# Patient Record
Sex: FEMALE | Race: WHITE | NOT HISPANIC OR LATINO | Employment: OTHER | URBAN - METROPOLITAN AREA
[De-identification: names, ages, dates, MRNs, and addresses within clinical notes are randomized per-mention and may not be internally consistent; named-entity substitution may affect disease eponyms.]

---

## 2021-07-27 PROBLEM — I10 HYPERTENSION: Status: ACTIVE | Noted: 2021-07-27

## 2021-07-27 PROBLEM — E78.5 HYPERLIPIDEMIA: Status: ACTIVE | Noted: 2021-07-27

## 2021-07-27 PROBLEM — M19.90 OSTEOARTHRITIS: Status: ACTIVE | Noted: 2021-07-27

## 2021-07-27 PROBLEM — F32.A DEPRESSION: Status: ACTIVE | Noted: 2021-07-27

## 2021-07-27 PROBLEM — J44.9 CHRONIC OBSTRUCTIVE PULMONARY DISEASE (HCC): Status: ACTIVE | Noted: 2021-07-27

## 2021-07-27 PROBLEM — E03.9 HYPOTHYROIDISM: Status: ACTIVE | Noted: 2021-07-27

## 2021-07-27 RX ORDER — DOXAZOSIN MESYLATE 4 MG/1
TABLET ORAL
COMMUNITY
Start: 2015-09-22

## 2021-07-27 RX ORDER — LEVOTHYROXINE SODIUM 0.05 MG/1
TABLET ORAL
COMMUNITY
Start: 2015-09-22

## 2021-07-27 RX ORDER — MELOXICAM 15 MG/1
TABLET ORAL
COMMUNITY
Start: 2015-09-22

## 2021-07-27 RX ORDER — ATENOLOL 50 MG/1
TABLET ORAL
COMMUNITY
Start: 2015-09-22

## 2021-07-27 RX ORDER — FLUOXETINE HYDROCHLORIDE 40 MG/1
CAPSULE ORAL
COMMUNITY
Start: 2015-09-22 | End: 2022-10-31

## 2021-07-27 RX ORDER — LOVASTATIN 40 MG/1
TABLET ORAL
COMMUNITY
Start: 2015-09-22

## 2021-07-27 RX ORDER — ALPRAZOLAM 1 MG/1
TABLET ORAL
COMMUNITY
Start: 2015-09-22

## 2021-07-27 RX ORDER — TRAZODONE HYDROCHLORIDE 150 MG/1
TABLET ORAL
COMMUNITY
Start: 2015-09-22

## 2021-07-27 RX ORDER — HYDROCODONE BITARTRATE AND ACETAMINOPHEN 10; 325 MG/1; MG/1
TABLET ORAL
COMMUNITY
Start: 2015-09-22 | End: 2022-10-31

## 2021-07-27 RX ORDER — GABAPENTIN 300 MG/1
300 CAPSULE ORAL 3 TIMES DAILY
COMMUNITY
Start: 2015-09-22

## 2021-07-27 RX ORDER — ASPIRIN 81 MG/1
TABLET ORAL
COMMUNITY
Start: 2015-09-22 | End: 2022-10-31

## 2021-07-27 RX ORDER — ESOMEPRAZOLE MAGNESIUM 40 MG/1
FOR SUSPENSION ORAL
COMMUNITY
Start: 2015-09-22 | End: 2022-10-31

## 2021-08-02 ENCOUNTER — OFFICE VISIT (OUTPATIENT)
Dept: NEUROLOGY | Facility: CLINIC | Age: 71
End: 2021-08-02

## 2021-08-02 VITALS
HEART RATE: 47 BPM | HEIGHT: 65 IN | WEIGHT: 207 LBS | BODY MASS INDEX: 34.49 KG/M2 | DIASTOLIC BLOOD PRESSURE: 85 MMHG | TEMPERATURE: 97.3 F | SYSTOLIC BLOOD PRESSURE: 143 MMHG

## 2021-08-02 DIAGNOSIS — I65.23 ATHEROSCLEROSIS OF BOTH CAROTID ARTERIES: ICD-10-CM

## 2021-08-02 DIAGNOSIS — G60.9 IDIOPATHIC PERIPHERAL NEUROPATHY: Primary | ICD-10-CM

## 2021-08-02 PROCEDURE — 99203 OFFICE O/P NEW LOW 30 MIN: CPT | Performed by: NURSE PRACTITIONER

## 2021-08-02 RX ORDER — FLUCONAZOLE 200 MG/1
TABLET ORAL
COMMUNITY
Start: 2021-05-10 | End: 2022-10-31

## 2021-08-02 RX ORDER — OMEPRAZOLE 40 MG/1
CAPSULE, DELAYED RELEASE ORAL
COMMUNITY
Start: 2021-07-19

## 2021-08-02 RX ORDER — ESCITALOPRAM OXALATE 20 MG/1
TABLET ORAL
COMMUNITY
Start: 2021-06-15

## 2021-08-02 RX ORDER — METHOCARBAMOL 750 MG/1
50000 TABLET ORAL WEEKLY
COMMUNITY
Start: 2021-07-14

## 2021-08-02 RX ORDER — FUROSEMIDE 40 MG/1
TABLET ORAL
COMMUNITY
Start: 2021-06-25

## 2022-10-31 ENCOUNTER — OFFICE VISIT (OUTPATIENT)
Dept: CARDIOLOGY | Facility: CLINIC | Age: 72
End: 2022-10-31

## 2022-10-31 VITALS
WEIGHT: 218.2 LBS | HEIGHT: 66 IN | BODY MASS INDEX: 35.07 KG/M2 | DIASTOLIC BLOOD PRESSURE: 80 MMHG | SYSTOLIC BLOOD PRESSURE: 140 MMHG | OXYGEN SATURATION: 96 % | HEART RATE: 57 BPM

## 2022-10-31 DIAGNOSIS — E78.2 MIXED HYPERLIPIDEMIA: Primary | ICD-10-CM

## 2022-10-31 DIAGNOSIS — I10 BENIGN ESSENTIAL HTN: ICD-10-CM

## 2022-10-31 DIAGNOSIS — R06.02 SOB (SHORTNESS OF BREATH): ICD-10-CM

## 2022-10-31 PROCEDURE — 99204 OFFICE O/P NEW MOD 45 MIN: CPT | Performed by: INTERNAL MEDICINE

## 2022-10-31 RX ORDER — LACTULOSE 10 G/15ML
10 SOLUTION ORAL 3 TIMES DAILY
COMMUNITY

## 2022-10-31 NOTE — PROGRESS NOTES
Date of Office Visit: 10/31/2022  Encounter Provider: Dr. Cruz Conde  Place of Service: [facility] CARDIOLOGY [place]  Patient Name: Dulce Roberts  :1950  Laci Willett MD    Chief Complaint   Patient presents with   • Shortness of Breath   • Edema     History of Present Illness:    I am pleased to see Mrs. Roberts in my office today as a new consultation.    As you know, patient is 72 years old white female whose past medical history significant for obstructive sleep apnea, hypertension, hyperlipidemia, who is referred to me for symptom of chest discomfort and shortness of breath.    Patient reports that she is having symptom of shortness of breath.  She walked for short distance and became extremely short of breath.  She has trace leg edema.  Patient is on diuretics.  Patient recently is complaining of chest pressure and heaviness described as elephant sitting on her chest especially with exertion.  Patient denies any orthopnea or PND.    Patient does not have previous history of CAD, PCI or MI.  Patient does not smoke or abuse alcohol.    In 2022, patient underwent echocardiogram which showed normal left ventricular size and function with a EF of 55 to 60%.  Mild mitral and tricuspid regurgitation noted.  RV systolic pressure was 39 mmHg.    At this stage I would recommend that patient should proceed with stress test with [facility].        Past Medical History:   Diagnosis Date   • Acute bronchitis    • Acute sinusitis    • Benign essential hypertension    • Bronchitis    • Coronary atherosclerosis    • Dysphagia    • Essential hypertension    • Essential tremor    • Family history of polyps in the colon    • GERD (gastroesophageal reflux disease)    • Hyperlipidemia    • Hypothyroidism    • Major depressive disorder    • Otitis    • Parkinson's disease (HCC)    • Reactive depression    • Tremor    • Upper respiratory infection          Past Surgical History:   Procedure  Laterality Date   • HYSTERECTOMY     • KNEE SURGERY             Current Outpatient Medications:   •  ALPRAZolam (XANAX) 1 MG tablet, ALPRAZOLAM 1 MG TABS, Disp: , Rfl:   •  atenolol (TENORMIN) 50 MG tablet, ATENOLOL 50 MG TABS, Disp: , Rfl:   •  carbidopa-levodopa (SINEMET)  MG per tablet, Take 1 tablet by mouth 3 (Three) Times a Day., Disp: , Rfl:   •  D3-50 1.25 MG (71700 UT) capsule, Take 50,000 Units by mouth 1 (One) Time Per Week., Disp: , Rfl:   •  doxazosin (CARDURA) 4 MG tablet, DOXAZOSIN MESYLATE 4 MG TABS, Disp: , Rfl:   •  escitalopram (LEXAPRO) 20 MG tablet, , Disp: , Rfl:   •  furosemide (LASIX) 40 MG tablet, , Disp: , Rfl:   •  gabapentin (NEURONTIN) 300 MG capsule, 300 mg 3 (Three) Times a Day., Disp: , Rfl:   •  lactulose (CHRONULAC) 10 GM/15ML solution, Take 15 mL by mouth 3 (Three) Times a Day., Disp: , Rfl:   •  levothyroxine (SYNTHROID, LEVOTHROID) 50 MCG tablet, LEVOTHYROXINE SODIUM 50 MCG TABS, Disp: , Rfl:   •  lovastatin (MEVACOR) 40 MG tablet, LOVASTATIN 40 MG TABS, Disp: , Rfl:   •  meloxicam (MOBIC) 15 MG tablet, MELOXICAM 15 MG TABS, Disp: , Rfl:   •  omeprazole (priLOSEC) 40 MG capsule, , Disp: , Rfl:   •  traZODone (DESYREL) 150 MG tablet, TRAZODONE  MG TABS, Disp: , Rfl:   •  isosorbide mononitrate (IMDUR) 30 MG 24 hr tablet, Take 1 tablet by mouth Daily., Disp: 90 tablet, Rfl: 3      Social History     Socioeconomic History   • Marital status: Unknown   Tobacco Use   • Smoking status: Never   • Smokeless tobacco: Current   Vaping Use   • Vaping Use: Never used   Substance and Sexual Activity   • Alcohol use: Never   • Drug use: Defer   • Sexual activity: Defer         Review of Systems   Constitutional: Negative for chills and fever.   HENT: Negative for ear discharge and nosebleeds.    Eyes: Negative for discharge and redness.   Cardiovascular: Positive for chest pain and leg swelling. Negative for orthopnea, palpitations, paroxysmal nocturnal dyspnea and syncope.  "  Respiratory: Positive for shortness of breath. Negative for cough and wheezing.    Endocrine: Negative for heat intolerance.   Skin: Negative for rash.   Musculoskeletal: Positive for arthritis, back pain and joint pain. Negative for myalgias.   Gastrointestinal: Negative for abdominal pain, melena, nausea and vomiting.   Genitourinary: Negative for dysuria and hematuria.   Neurological: Negative for dizziness, light-headedness, numbness and tremors.   Psychiatric/Behavioral: Negative for depression. The patient is not nervous/anxious.        Procedures    Procedures    No orders to display           Objective:    /80   Pulse 57   Ht 167.6 cm (66\")   Wt 99 kg (218 lb 3.2 oz)   SpO2 96%   BMI 35.22 kg/m²         Constitutional:       Appearance: Well-developed.   Eyes:      General: No scleral icterus.        Right eye: No discharge.   HENT:      Head: Normocephalic and atraumatic.   Neck:      Thyroid: No thyromegaly.      Lymphadenopathy: No cervical adenopathy.   Pulmonary:      Effort: Pulmonary effort is normal. No respiratory distress.      Breath sounds: Normal breath sounds. No wheezing. No rales.   Cardiovascular:      Normal rate. Regular rhythm.      No gallop.   Abdominal:      Tenderness: There is no abdominal tenderness.   Skin:     Findings: No erythema or rash.   Neurological:      Mental Status: Alert and oriented to person, place, and time.             Assessment:       Diagnosis Plan   1. Mixed hyperlipidemia  isosorbide mononitrate (IMDUR) 30 MG 24 hr tablet      2. Benign essential HTN  isosorbide mononitrate (IMDUR) 30 MG 24 hr tablet      3. SOB (shortness of breath)  isosorbide mononitrate (IMDUR) 30 MG 24 hr tablet               Plan:       MDM:    1.  Chest discomfort:    I would recommend to proceed with stress test.    2.  Shortness of breath:    Patient complaining of shortness of breath along with chest discomfort.  Echocardiogram showed preserved left ankle function.  Mild " elevation of RV systolic pressure is noted.  Consider stress test.    3.  Hypertension:    Blood pressure is on higher side but I would recommend observation    4.  Bradycardia sinus:    Patient has sinus bradycardia patient is on atenolol.  If heart rate continues to be low I would consider decreasing the dose of atenolol.

## 2022-11-14 ENCOUNTER — OUTSIDE FACILITY SERVICE (OUTPATIENT)
Dept: CARDIOLOGY | Facility: CLINIC | Age: 72
End: 2022-11-14

## 2022-11-14 PROCEDURE — 78452 HT MUSCLE IMAGE SPECT MULT: CPT | Performed by: INTERNAL MEDICINE

## 2022-11-14 PROCEDURE — 93016 CV STRESS TEST SUPVJ ONLY: CPT | Performed by: INTERNAL MEDICINE

## 2022-11-14 PROCEDURE — 93018 CV STRESS TEST I&R ONLY: CPT | Performed by: INTERNAL MEDICINE

## 2022-11-28 RX ORDER — ISOSORBIDE MONONITRATE 30 MG/1
30 TABLET, EXTENDED RELEASE ORAL DAILY
Qty: 90 TABLET | Refills: 3 | Status: SHIPPED | OUTPATIENT
Start: 2022-11-28 | End: 2022-11-30 | Stop reason: SDUPTHER

## 2022-11-30 ENCOUNTER — TELEPHONE (OUTPATIENT)
Dept: CARDIOLOGY | Facility: CLINIC | Age: 72
End: 2022-11-30

## 2022-11-30 DIAGNOSIS — R06.02 SOB (SHORTNESS OF BREATH): ICD-10-CM

## 2022-11-30 DIAGNOSIS — I10 BENIGN ESSENTIAL HTN: ICD-10-CM

## 2022-11-30 DIAGNOSIS — E78.2 MIXED HYPERLIPIDEMIA: ICD-10-CM

## 2022-11-30 RX ORDER — ISOSORBIDE MONONITRATE 30 MG/1
30 TABLET, EXTENDED RELEASE ORAL DAILY
Qty: 90 TABLET | Refills: 3 | Status: SHIPPED | OUTPATIENT
Start: 2022-11-30

## 2022-11-30 NOTE — TELEPHONE ENCOUNTER
She was in October 31st to see Dr Conde  A new heart medication was suppose to be sent in  She didn't get it  Her  was in for an appointment with  Monday  He talked with  about sending it in  He wrote the prescription on a paper and was suppose to resend  it in  She has called both pharmacies  that she uses and they haven't received anything  Please send to [facility]  Please call patient so she know that this ws done

## 2023-05-05 NOTE — PROGRESS NOTES
Date of Office Visit: 2023  Encounter Provider: Dr. Cruz Conde  Place of Service: [facility] CARDIOLOGY [place]  Patient Name: Dulce Roberts  :1950  Laci Willett MD    Chief Complaint   Patient presents with   • Shortness of Breath   • Follow-up   • Hypertension   • Hyperlipidemia   • Coronary Artery Disease     History of Present Illness    I am pleased to see Mrs. Roberts in my office today as a as a follow-up.    As you know, patient is 72 years old white female whose past medical history significant for obstructive sleep apnea, hypertension, hyperlipidemia, who came today for follow-up.    Patient reports that she is having symptom of shortness of breath.  She walked for short distance and became extremely short of breath.  She has trace leg edema.  Patient is on diuretics.  Patient recently is complaining of chest pressure and heaviness described as elephant sitting on her chest especially with exertion.  Patient denies any orthopnea or PND.    Patient does not have previous history of CAD, PCI or MI.  Patient does not smoke or abuse alcohol.    In 2022, patient underwent echocardiogram which showed normal left ventricular size and function with a EF of 55 to 60%.  Mild mitral and tricuspid regurgitation noted.  RV systolic pressure was 39 mmHg.  In 2022, patient underwent stress test which showed no myocardial ischemia.  Fixed inferior defect was noted.    Patient came today for follow-up.  Patient does not have any further episode of chest pain.  Patient does have shortness of breath on exertion.  She is limited in her activities.  Patient denies any orthopnea, PND, syncope or presyncope.  No leg edema noted.    EKG showed normal sinus rhythm nonspecific ST changes noted.  No change in previous EKG    Patient had cardiac work-up including stress test and echocardiogram which is unremarkable.  Patient free of significant symptoms.  I would continue current  therapy.      Past Medical History:   Diagnosis Date   • Acute bronchitis    • Acute sinusitis    • Benign essential hypertension    • Bronchitis    • Coronary atherosclerosis    • Dysphagia    • Essential hypertension    • Essential tremor    • Family history of polyps in the colon    • GERD (gastroesophageal reflux disease)    • Hyperlipidemia    • Hypothyroidism    • Major depressive disorder    • Otitis    • Parkinson's disease    • Reactive depression    • Tremor    • Upper respiratory infection          Past Surgical History:   Procedure Laterality Date   • HYSTERECTOMY     • KNEE SURGERY             Current Outpatient Medications:   •  ALPRAZolam (XANAX) 1 MG tablet, ALPRAZOLAM 1 MG TABS, Disp: , Rfl:   •  atenolol (TENORMIN) 50 MG tablet, ATENOLOL 50 MG TABS, Disp: , Rfl:   •  carbidopa-levodopa (SINEMET)  MG per tablet, Take 1 tablet by mouth 3 (Three) Times a Day., Disp: , Rfl:   •  D3-50 1.25 MG (00011 UT) capsule, Take 1 capsule by mouth 1 (One) Time Per Week., Disp: , Rfl:   •  doxazosin (CARDURA) 4 MG tablet, DOXAZOSIN MESYLATE 4 MG TABS, Disp: , Rfl:   •  escitalopram (LEXAPRO) 20 MG tablet, , Disp: , Rfl:   •  furosemide (LASIX) 40 MG tablet, , Disp: , Rfl:   •  gabapentin (NEURONTIN) 300 MG capsule, 1 capsule 3 (Three) Times a Day., Disp: , Rfl:   •  isosorbide mononitrate (IMDUR) 30 MG 24 hr tablet, Take 1 tablet by mouth Daily., Disp: 90 tablet, Rfl: 3  •  lactulose (CHRONULAC) 10 GM/15ML solution, Take 15 mL by mouth 3 (Three) Times a Day., Disp: , Rfl:   •  levothyroxine (SYNTHROID, LEVOTHROID) 50 MCG tablet, LEVOTHYROXINE SODIUM 50 MCG TABS, Disp: , Rfl:   •  lovastatin (MEVACOR) 40 MG tablet, LOVASTATIN 40 MG TABS, Disp: , Rfl:   •  meloxicam (MOBIC) 15 MG tablet, MELOXICAM 15 MG TABS, Disp: , Rfl:   •  omeprazole (priLOSEC) 40 MG capsule, , Disp: , Rfl:   •  traZODone (DESYREL) 150 MG tablet, TRAZODONE  MG TABS, Disp: , Rfl:       Social History     Socioeconomic History   •  "Marital status: Unknown   Tobacco Use   • Smoking status: Never   • Smokeless tobacco: Never   Vaping Use   • Vaping Use: Never used   Substance and Sexual Activity   • Alcohol use: Never   • Drug use: Defer   • Sexual activity: Defer         Review of Systems   Constitutional: Negative for chills and fever.   HENT: Negative for ear discharge and nosebleeds.    Eyes: Negative for discharge and redness.   Cardiovascular: Negative for chest pain, orthopnea, palpitations, paroxysmal nocturnal dyspnea and syncope.   Respiratory: Positive for shortness of breath. Negative for cough and wheezing.    Endocrine: Negative for heat intolerance.   Skin: Negative for rash.   Musculoskeletal: Positive for arthritis, back pain and joint pain. Negative for myalgias.   Gastrointestinal: Negative for abdominal pain, melena, nausea and vomiting.   Genitourinary: Negative for dysuria and hematuria.   Neurological: Negative for dizziness, light-headedness, numbness and tremors.   Psychiatric/Behavioral: Negative for depression. The patient is not nervous/anxious.        Procedures    Procedures    No orders to display           Objective:    /71 (BP Location: Right arm, Patient Position: Sitting, Cuff Size: Large Adult)   Pulse 61   Ht 167.6 cm (65.98\")   Wt 102 kg (225 lb)   SpO2 99%   BMI 36.33 kg/m²         Constitutional:       Appearance: Well-developed.   Eyes:      General: No scleral icterus.        Right eye: No discharge.   HENT:      Head: Normocephalic and atraumatic.   Neck:      Thyroid: No thyromegaly.      Lymphadenopathy: No cervical adenopathy.   Pulmonary:      Effort: Pulmonary effort is normal. No respiratory distress.      Breath sounds: Normal breath sounds. No wheezing. No rales.   Cardiovascular:      Normal rate. Regular rhythm.      No gallop.   Abdominal:      Tenderness: There is no abdominal tenderness.   Skin:     Findings: No erythema or rash.   Neurological:      Mental Status: Alert and " oriented to person, place, and time.             Assessment:       Diagnosis Plan   1. Mixed hyperlipidemia        2. Benign essential HTN        3. SOB (shortness of breath)                 Plan:       MDM:    1.  Chest discomfort:    Patient symptom of chest discomfort self-contained with isosorbide.  Continue that.    2.  Shortness of breath:    Echocardiogram showed preserved left ventricular function no significant valvular heart disease noted    3.  Hypertension:    Blood pressure is controlled current treatment would be continued    4.  Hyperlipidemia:    Patient is advised to repeat lipid panel testing.  She is on lovastatin

## 2023-05-08 ENCOUNTER — OFFICE VISIT (OUTPATIENT)
Dept: CARDIOLOGY | Facility: CLINIC | Age: 73
End: 2023-05-08
Payer: MEDICARE

## 2023-05-08 VITALS
SYSTOLIC BLOOD PRESSURE: 122 MMHG | OXYGEN SATURATION: 99 % | HEIGHT: 66 IN | HEART RATE: 61 BPM | BODY MASS INDEX: 36.16 KG/M2 | DIASTOLIC BLOOD PRESSURE: 71 MMHG | WEIGHT: 225 LBS

## 2023-05-08 DIAGNOSIS — R06.02 SOB (SHORTNESS OF BREATH): ICD-10-CM

## 2023-05-08 DIAGNOSIS — I10 BENIGN ESSENTIAL HTN: ICD-10-CM

## 2023-05-08 DIAGNOSIS — E78.2 MIXED HYPERLIPIDEMIA: Primary | ICD-10-CM

## 2023-05-08 PROCEDURE — 99214 OFFICE O/P EST MOD 30 MIN: CPT | Performed by: INTERNAL MEDICINE

## 2023-05-08 PROCEDURE — 1160F RVW MEDS BY RX/DR IN RCRD: CPT | Performed by: INTERNAL MEDICINE

## 2023-05-08 PROCEDURE — 1159F MED LIST DOCD IN RCRD: CPT | Performed by: INTERNAL MEDICINE

## 2023-05-08 PROCEDURE — 3074F SYST BP LT 130 MM HG: CPT | Performed by: INTERNAL MEDICINE

## 2023-05-08 PROCEDURE — 3078F DIAST BP <80 MM HG: CPT | Performed by: INTERNAL MEDICINE

## 2023-12-04 DIAGNOSIS — I10 BENIGN ESSENTIAL HTN: ICD-10-CM

## 2023-12-04 DIAGNOSIS — E78.2 MIXED HYPERLIPIDEMIA: ICD-10-CM

## 2023-12-04 DIAGNOSIS — R06.02 SOB (SHORTNESS OF BREATH): ICD-10-CM

## 2023-12-04 RX ORDER — ISOSORBIDE MONONITRATE 30 MG/1
30 TABLET, EXTENDED RELEASE ORAL DAILY
Qty: 90 TABLET | Refills: 3 | Status: SHIPPED | OUTPATIENT
Start: 2023-12-04

## 2024-01-05 NOTE — PROGRESS NOTES
Date of Office Visit: 2024  Encounter Provider: Dr. Cruz Conde  Place of Service: [facility] CARDIOLOGY [place]  Patient Name: Dulce Roberts  :1950  Laci Willett MD    Chief Complaint   Patient presents with    Follow-up    Hyperlipidemia    Shortness of Breath    Hypertension     History of Present Illness    I am pleased to see Mrs. Roberts in my office today as a as a follow-up.    As you know, patient is 73years old white female whose past medical history significant for obstructive sleep apnea, hypertension, hyperlipidemia, who came today for follow-up.    Patient reports that she is having symptom of shortness of breath.  She walked for short distance and became extremely short of breath.  She has trace leg edema.  Patient is on diuretics.  Patient recently is complaining of chest pressure and heaviness described as elephant sitting on her chest especially with exertion.  Patient denies any orthopnea or PND.    Patient does not have previous history of CAD, PCI or MI.  Patient does not smoke or abuse alcohol.    In 2022, patient underwent echocardiogram which showed normal left ventricular size and function with a EF of 55 to 60%.  Mild mitral and tricuspid regurgitation noted.  RV systolic pressure was 39 mmHg.  In 2022, patient underwent stress test which showed no myocardial ischemia.  Fixed inferior defect was noted.    Patient came today for follow-up.  Overall patient is doing well.  Patient denies any symptom of chest pain or tightness or heaviness.  No orthopnea PND no syncope or presyncope.  No leg edema noted.    EKG showed normal sinus rhythm with a heart rate of 58 bpm and first-degree AV block noted..  No change from previous EKG    Her blood pressure is slightly elevated in office but blood pressure reading previously has been stabilized.  I have advised the patient to monitor the blood pressure and bring the logbook.  Patient had recent stress  test and echocardiogram which was unremarkable.      Past Medical History:   Diagnosis Date    Acute bronchitis     Acute sinusitis     Benign essential hypertension     Bronchitis     Coronary atherosclerosis     Dysphagia     Essential hypertension     Essential tremor     Family history of polyps in the colon     GERD (gastroesophageal reflux disease)     Hyperlipidemia     Hypothyroidism     Major depressive disorder     Otitis     Parkinson's disease     Reactive depression     Tremor     Upper respiratory infection          Past Surgical History:   Procedure Laterality Date    HYSTERECTOMY      KNEE SURGERY             Current Outpatient Medications:     ALPRAZolam (XANAX) 1 MG tablet, ALPRAZOLAM 1 MG TABS, Disp: , Rfl:     atenolol (TENORMIN) 50 MG tablet, ATENOLOL 50 MG TABS, Disp: , Rfl:     carbidopa-levodopa (SINEMET)  MG per tablet, Take 1 tablet by mouth 3 (Three) Times a Day., Disp: , Rfl:     D3-50 1.25 MG (04712 UT) capsule, Take 1 capsule by mouth 1 (One) Time Per Week., Disp: , Rfl:     doxazosin (CARDURA) 4 MG tablet, DOXAZOSIN MESYLATE 4 MG TABS, Disp: , Rfl:     escitalopram (LEXAPRO) 20 MG tablet, , Disp: , Rfl:     furosemide (LASIX) 40 MG tablet, , Disp: , Rfl:     gabapentin (NEURONTIN) 300 MG capsule, 1 capsule 3 (Three) Times a Day., Disp: , Rfl:     isosorbide mononitrate (IMDUR) 30 MG 24 hr tablet, TAKE 1 TABLET EVERY DAY, Disp: 90 tablet, Rfl: 3    lactulose (CHRONULAC) 10 GM/15ML solution, Take 15 mL by mouth 3 (Three) Times a Day., Disp: , Rfl:     levothyroxine (SYNTHROID, LEVOTHROID) 50 MCG tablet, LEVOTHYROXINE SODIUM 50 MCG TABS, Disp: , Rfl:     lovastatin (MEVACOR) 40 MG tablet, LOVASTATIN 40 MG TABS, Disp: , Rfl:     meloxicam (MOBIC) 15 MG tablet, MELOXICAM 15 MG TABS, Disp: , Rfl:     omeprazole (priLOSEC) 40 MG capsule, , Disp: , Rfl:     traZODone (DESYREL) 150 MG tablet, TRAZODONE  MG TABS, Disp: , Rfl:       Social History     Socioeconomic History    Marital  "status: Unknown   Tobacco Use    Smoking status: Never    Smokeless tobacco: Never   Vaping Use    Vaping Use: Never used   Substance and Sexual Activity    Alcohol use: Never    Drug use: Defer    Sexual activity: Defer         Review of Systems   Constitutional: Negative for chills and fever.   HENT:  Negative for ear discharge and nosebleeds.    Eyes:  Negative for discharge and redness.   Cardiovascular:  Negative for chest pain, orthopnea, palpitations, paroxysmal nocturnal dyspnea and syncope.   Respiratory:  Positive for shortness of breath. Negative for cough and wheezing.    Endocrine: Negative for heat intolerance.   Skin:  Negative for rash.   Musculoskeletal:  Negative for arthritis and myalgias.   Gastrointestinal:  Negative for abdominal pain, melena, nausea and vomiting.   Genitourinary:  Negative for dysuria and hematuria.   Neurological:  Negative for dizziness, light-headedness, numbness and tremors.   Psychiatric/Behavioral:  Negative for depression. The patient is not nervous/anxious.        Procedures    Procedures    No orders to display           Objective:    /80   Pulse 58   Ht 167.6 cm (66\")   Wt 103 kg (227 lb 8 oz)   SpO2 96%   BMI 36.72 kg/m²         Constitutional:       Appearance: Well-developed.   Eyes:      General: No scleral icterus.        Right eye: No discharge.   HENT:      Head: Normocephalic and atraumatic.   Neck:      Thyroid: No thyromegaly.      Lymphadenopathy: No cervical adenopathy.   Pulmonary:      Effort: Pulmonary effort is normal. No respiratory distress.      Breath sounds: Normal breath sounds. No wheezing. No rales.   Cardiovascular:      Normal rate. Regular rhythm.      No gallop.    Edema:     Peripheral edema absent.   Abdominal:      Tenderness: There is no abdominal tenderness.   Skin:     Findings: No erythema or rash.   Neurological:      Mental Status: Alert and oriented to person, place, and time.             Assessment:       Diagnosis " Plan   1. Mixed hyperlipidemia        2. Benign essential HTN        3. SOB (shortness of breath)                 Plan:       MDM:    1.  Hypertension:    Blood pressure is slightly high.  I would recommend that patient should proceed with current treatment.  Blood pressure monitoring is recommended    2.  Hyperlipidemia:    Patient is on lovastatin repeat lipid panel testing    3.  Shortness of breath:    Patient is short of breath but she is very limited in her activity.  I would continue to observe there is no sign and symptom of congestive heart failure

## 2024-01-08 ENCOUNTER — OFFICE VISIT (OUTPATIENT)
Dept: CARDIOLOGY | Facility: CLINIC | Age: 74
End: 2024-01-08
Payer: MEDICARE

## 2024-01-08 VITALS
BODY MASS INDEX: 36.56 KG/M2 | WEIGHT: 227.5 LBS | HEART RATE: 58 BPM | DIASTOLIC BLOOD PRESSURE: 80 MMHG | HEIGHT: 66 IN | SYSTOLIC BLOOD PRESSURE: 132 MMHG | OXYGEN SATURATION: 96 %

## 2024-01-08 DIAGNOSIS — E78.2 MIXED HYPERLIPIDEMIA: Primary | ICD-10-CM

## 2024-01-08 DIAGNOSIS — R06.02 SOB (SHORTNESS OF BREATH): ICD-10-CM

## 2024-01-08 DIAGNOSIS — I10 BENIGN ESSENTIAL HTN: ICD-10-CM

## 2024-06-27 NOTE — PROGRESS NOTES
Date of Office Visit: 2024  Encounter Provider: Dr. Cruz Conde  Place of Service: [facility] CARDIOLOGY [place]  Patient Name: Dulce Roberts  :1950  Laci Willett MD    Chief Complaint   Patient presents with    Coronary Artery Disease    Hypertension    Hyperlipidemia    Follow-up     History of Present Illness    I am pleased to see Mrs. Roberts in my office today as a as a follow-up.    As you know, patient is 73years old white female whose past medical history significant for obstructive sleep apnea, hypertension, hyperlipidemia, who came today for follow-up.    Patient reports that she is having symptom of shortness of breath.  She walked for short distance and became extremely short of breath.  She has trace leg edema.  Patient is on diuretics.  Patient recently is complaining of chest pressure and heaviness described as elephant sitting on her chest especially with exertion.  Patient denies any orthopnea or PND.    Patient does not have previous history of CAD, PCI or MI.  Patient does not smoke or abuse alcohol.    In 2022, patient underwent echocardiogram which showed normal left ventricular size and function with a EF of 55 to 60%.  Mild mitral and tricuspid regurgitation noted.  RV systolic pressure was 39 mmHg.  In 2022, patient underwent stress test which showed no myocardial ischemia.  Fixed inferior defect was noted.    Patient came today for 9-month follow-up.  Patient continues to have baseline shortness of breath.  Patient had a fall.  It was mechanical fall.  Patient has Parkinson disease.  Patient walks with a cane but balance and disequilibrium is affected.  Patient denies any chest pain.  No orthopnea PND no syncope or presyncope.  No leg edema noted.    Patient is overall doing well from a cardiovascular standpoint.  At this stage I would recommend to continue current treatment.  Blood pressure is very well-controlled.  No further cardiac workup  is noted.  Her baseline shortness of breath is probably due to deconditioning and COPD repeat lipid panel testing.  Patient follows with Dr. Willett.  Continue Imdur    I will see the patient as needed.  Patient is advised to call my office if there is recurrence of symptoms in future      Past Medical History:   Diagnosis Date    Acute bronchitis     Acute sinusitis     Benign essential hypertension     Bronchitis     Coronary atherosclerosis     Dysphagia     Essential hypertension     Essential tremor     Family history of polyps in the colon     GERD (gastroesophageal reflux disease)     Hyperlipidemia     Hypothyroidism     Major depressive disorder     Otitis     Parkinson's disease     Reactive depression     Tremor     Upper respiratory infection          Past Surgical History:   Procedure Laterality Date    HYSTERECTOMY      KNEE SURGERY             Current Outpatient Medications:     ALPRAZolam (XANAX) 1 MG tablet, ALPRAZOLAM 1 MG TABS, Disp: , Rfl:     atenolol (TENORMIN) 50 MG tablet, ATENOLOL 50 MG TABS, Disp: , Rfl:     carbidopa-levodopa (SINEMET)  MG per tablet, Take 1 tablet by mouth 3 (Three) Times a Day., Disp: , Rfl:     D3-50 1.25 MG (73240 UT) capsule, Take 1 capsule by mouth 1 (One) Time Per Week., Disp: , Rfl:     doxazosin (CARDURA) 4 MG tablet, DOXAZOSIN MESYLATE 4 MG TABS, Disp: , Rfl:     escitalopram (LEXAPRO) 20 MG tablet, , Disp: , Rfl:     furosemide (LASIX) 40 MG tablet, , Disp: , Rfl:     gabapentin (NEURONTIN) 300 MG capsule, 1 capsule 3 (Three) Times a Day., Disp: , Rfl:     isosorbide mononitrate (IMDUR) 30 MG 24 hr tablet, TAKE 1 TABLET EVERY DAY, Disp: 90 tablet, Rfl: 3    lactulose (CHRONULAC) 10 GM/15ML solution, Take 15 mL by mouth 3 (Three) Times a Day., Disp: , Rfl:     levothyroxine (SYNTHROID, LEVOTHROID) 50 MCG tablet, LEVOTHYROXINE SODIUM 50 MCG TABS, Disp: , Rfl:     lovastatin (MEVACOR) 40 MG tablet, LOVASTATIN 40 MG TABS, Disp: , Rfl:     meloxicam (MOBIC) 15  "MG tablet, MELOXICAM 15 MG TABS, Disp: , Rfl:     omeprazole (priLOSEC) 40 MG capsule, , Disp: , Rfl:     traZODone (DESYREL) 150 MG tablet, TRAZODONE  MG TABS, Disp: , Rfl:       Social History     Socioeconomic History    Marital status: Unknown   Tobacco Use    Smoking status: Never    Smokeless tobacco: Never   Vaping Use    Vaping status: Never Used   Substance and Sexual Activity    Alcohol use: Never    Drug use: Defer    Sexual activity: Defer         Review of Systems   Constitutional: Negative for chills and fever.   HENT:  Negative for ear discharge and nosebleeds.    Eyes:  Negative for discharge and redness.   Cardiovascular:  Negative for chest pain, orthopnea, palpitations, paroxysmal nocturnal dyspnea and syncope.   Respiratory:  Positive for shortness of breath. Negative for cough and wheezing.    Endocrine: Negative for heat intolerance.   Skin:  Negative for rash.   Musculoskeletal:  Negative for arthritis and myalgias.   Gastrointestinal:  Negative for abdominal pain, melena, nausea and vomiting.   Genitourinary:  Negative for dysuria and hematuria.   Neurological:  Negative for dizziness, light-headedness, numbness and tremors.   Psychiatric/Behavioral:  Negative for depression. The patient is not nervous/anxious.        Procedures    Procedures    No orders to display           Objective:    /64 (BP Location: Right arm, Patient Position: Sitting, Cuff Size: Large Adult)   Pulse 66   Resp 16   Ht 167.6 cm (65.98\")   Wt 102 kg (225 lb)   SpO2 97%   BMI 36.33 kg/m²         Constitutional:       Appearance: Well-developed.   Eyes:      General: No scleral icterus.        Right eye: No discharge.   HENT:      Head: Normocephalic and atraumatic.   Neck:      Thyroid: No thyromegaly.      Lymphadenopathy: No cervical adenopathy.   Pulmonary:      Effort: Pulmonary effort is normal. No respiratory distress.      Breath sounds: Normal breath sounds. No wheezing. No rales. "   Cardiovascular:      Normal rate. Regular rhythm.      No gallop.    Edema:     Peripheral edema absent.   Abdominal:      Tenderness: There is no abdominal tenderness.   Skin:     Findings: No erythema or rash.   Neurological:      Mental Status: Alert and oriented to person, place, and time.             Assessment:       Diagnosis Plan   1. Precordial pain        2. Mixed hyperlipidemia        3. Benign essential HTN        4. SOB (shortness of breath)        5. Chronic obstructive pulmonary disease, unspecified COPD type                 Plan:       MDM:    1.  Precordial chest pain:    Patient has not reported any further symptoms.  Previous stress test was negative.  Continue isosorbide mononitrate    2.  Hypertension:    Blood pressure is controlled on current regimen with atenolol.    3.  Shortness of breath:    Most likely cause of shortness of breath is underlying COPD    4.  Bilateral leg edema    Patient is on furosemide.  Continue current dose

## 2024-07-01 ENCOUNTER — OFFICE VISIT (OUTPATIENT)
Dept: CARDIOLOGY | Facility: CLINIC | Age: 74
End: 2024-07-01
Payer: MEDICARE

## 2024-07-01 VITALS
WEIGHT: 225 LBS | HEIGHT: 66 IN | BODY MASS INDEX: 36.16 KG/M2 | OXYGEN SATURATION: 97 % | SYSTOLIC BLOOD PRESSURE: 117 MMHG | RESPIRATION RATE: 16 BRPM | DIASTOLIC BLOOD PRESSURE: 64 MMHG | HEART RATE: 66 BPM

## 2024-07-01 DIAGNOSIS — J44.9 CHRONIC OBSTRUCTIVE PULMONARY DISEASE, UNSPECIFIED COPD TYPE: ICD-10-CM

## 2024-07-01 DIAGNOSIS — R07.2 PRECORDIAL PAIN: Primary | ICD-10-CM

## 2024-07-01 DIAGNOSIS — R06.02 SOB (SHORTNESS OF BREATH): ICD-10-CM

## 2024-07-01 DIAGNOSIS — E78.2 MIXED HYPERLIPIDEMIA: ICD-10-CM

## 2024-07-01 DIAGNOSIS — I10 BENIGN ESSENTIAL HTN: ICD-10-CM

## 2024-07-01 PROCEDURE — 1160F RVW MEDS BY RX/DR IN RCRD: CPT | Performed by: INTERNAL MEDICINE

## 2024-07-01 PROCEDURE — 3078F DIAST BP <80 MM HG: CPT | Performed by: INTERNAL MEDICINE

## 2024-07-01 PROCEDURE — 99214 OFFICE O/P EST MOD 30 MIN: CPT | Performed by: INTERNAL MEDICINE

## 2024-07-01 PROCEDURE — 1159F MED LIST DOCD IN RCRD: CPT | Performed by: INTERNAL MEDICINE

## 2024-07-01 PROCEDURE — 3074F SYST BP LT 130 MM HG: CPT | Performed by: INTERNAL MEDICINE
